# Patient Record
Sex: FEMALE | Race: OTHER | HISPANIC OR LATINO | URBAN - METROPOLITAN AREA
[De-identification: names, ages, dates, MRNs, and addresses within clinical notes are randomized per-mention and may not be internally consistent; named-entity substitution may affect disease eponyms.]

---

## 2019-12-14 ENCOUNTER — EMERGENCY (EMERGENCY)
Facility: HOSPITAL | Age: 24
LOS: 1 days | Discharge: HOME | End: 2019-12-14
Attending: EMERGENCY MEDICINE
Payer: SELF-PAY

## 2019-12-14 VITALS
TEMPERATURE: 98 F | RESPIRATION RATE: 17 BRPM | OXYGEN SATURATION: 100 % | HEART RATE: 92 BPM | SYSTOLIC BLOOD PRESSURE: 100 MMHG | WEIGHT: 128.97 LBS | DIASTOLIC BLOOD PRESSURE: 68 MMHG | HEIGHT: 67 IN

## 2019-12-14 PROCEDURE — 99283 EMERGENCY DEPT VISIT LOW MDM: CPT

## 2019-12-14 RX ORDER — ONDANSETRON 8 MG/1
4 TABLET, FILM COATED ORAL ONCE
Refills: 0 | Status: COMPLETED | OUTPATIENT
Start: 2019-12-14 | End: 2019-12-14

## 2019-12-14 RX ORDER — IBUPROFEN 200 MG
600 TABLET ORAL ONCE
Refills: 0 | Status: COMPLETED | OUTPATIENT
Start: 2019-12-14 | End: 2019-12-14

## 2019-12-14 RX ORDER — ACETAMINOPHEN 500 MG
975 TABLET ORAL ONCE
Refills: 0 | Status: COMPLETED | OUTPATIENT
Start: 2019-12-14 | End: 2019-12-14

## 2019-12-14 RX ADMIN — Medication 975 MILLIGRAM(S): at 14:16

## 2019-12-14 RX ADMIN — Medication 975 MILLIGRAM(S): at 14:42

## 2019-12-14 RX ADMIN — ONDANSETRON 4 MILLIGRAM(S): 8 TABLET, FILM COATED ORAL at 14:17

## 2019-12-14 RX ADMIN — Medication 600 MILLIGRAM(S): at 14:17

## 2019-12-14 RX ADMIN — Medication 600 MILLIGRAM(S): at 14:42

## 2019-12-14 NOTE — ED PROVIDER NOTE - NSFOLLOWUPINSTRUCTIONS_ED_ALL_ED_FT
Buy and use a CO detector today.  Make sure [employer] inspected the building today.  Drink plenty of fluids.  Follow up with your doctor or in the Clinic as needed within 1 week.  Return to the ER for any concerns.

## 2019-12-14 NOTE — ED PROVIDER NOTE - NSFOLLOWUPCLINICS_GEN_ALL_ED_FT
[facility]  [facility]  [address] [facility] - [address] [place] [address]  Phone: [phone]  Fax: [phone]  Follow Up Time:

## 2019-12-14 NOTE — ED PROVIDER NOTE - PROGRESS NOTE DETAILS
reevaluated, feesl much better, HA resolved, tolerating PO. Called [employer]. Advised patients to air out apartment, discuss w neris, buy CO detector. Reevaluated, feels much better, HA resolved, tolerating PO. Called 911/[employer]. Advised patients to air out apartment, discuss w neris, buy CO detector. Repeat CO level 14, improving. Will DC

## 2019-12-14 NOTE — ED PROVIDER NOTE - PATIENT PORTAL LINK FT
You can access the FollowMyHealth Patient Portal offered by [facility] by registering at the following website: [email or web address]. By joining [facility]’s FollowMyHealth portal, you will also be able to view your health information using other applications (apps) compatible with our system.

## 2019-12-14 NOTE — ED PROVIDER NOTE - CLINICAL SUMMARY MEDICAL DECISION MAKING FREE TEXT BOX
Headache, nausea and vomiting on 3 members of the same family for 1 day. CO levels of 20, 19 and 15 on the young adult. Will terat for CO poisoning with oxygen, give ibuprofen, Tylenol and nausea medication. Will call [employer] to check on the building and recheck CO levels.

## 2019-12-14 NOTE — ED PROVIDER NOTE - PRINCIPAL DIAGNOSIS
----- Message from Bijan Gates DO sent at 9/5/2019  8:33 AM CDT -----  Neg endometrial biopsy, possible polyp, monitor bleeding with IUD over next several months, if bleeding continues to be heavy will need to do hysteroscopy and possible polyp removal and will need to remove IUD  
Call to patient-  Patient advised of message below. Patient states she has already noticed improvement with bleeding with the IUD, only having light spotting.  Patient will monitor over the next few months and let us know if bleeding continues to be heavy.  Patient verbalizes understanding.    
Toxic effect of carbon monoxide, unintentional, initial encounter

## 2019-12-14 NOTE — ED PROVIDER NOTE - MUSCULOSKELETAL, MLM
Spine appears normal, range of motion is not limited, no muscle or joint tenderness. Neck full range of motion, neurovascularly intact.

## 2019-12-14 NOTE — ED PROVIDER NOTE - OBJECTIVE STATEMENT
23 y/o F pt with no PMHx, presents to the ED with complaints of mild headache, nausea and 1 episode of vomiting. Patient reports her boiler heater was broken and someone came to fix it. Notes heaters are in every room throughout the house. Patient denies neck pain or any other complaints. NKDA.

## 2019-12-14 NOTE — ED ADULT NURSE NOTE - NSIMPLEMENTINTERV_GEN_ALL_ED
Implemented All Universal Safety Interventions:  [place] to call system. Call bell, personal items and telephone within reach. Instruct patient to call for assistance. Room bathroom lighting operational. Non-slip footwear when patient is off stretcher. Physically safe environment: no spills, clutter or unnecessary equipment. Stretcher in lowest position, wheels locked, appropriate side rails in place.